# Patient Record
Sex: MALE | Race: OTHER
[De-identification: names, ages, dates, MRNs, and addresses within clinical notes are randomized per-mention and may not be internally consistent; named-entity substitution may affect disease eponyms.]

---

## 2023-01-23 ENCOUNTER — HOSPITAL ENCOUNTER (EMERGENCY)
Dept: HOSPITAL 54 - ER | Age: 58
Discharge: HOME | End: 2023-01-23
Payer: COMMERCIAL

## 2023-01-23 VITALS — HEIGHT: 68 IN | WEIGHT: 210 LBS | BODY MASS INDEX: 31.83 KG/M2

## 2023-01-23 VITALS — DIASTOLIC BLOOD PRESSURE: 70 MMHG | SYSTOLIC BLOOD PRESSURE: 132 MMHG

## 2023-01-23 DIAGNOSIS — S52.511A: Primary | ICD-10-CM
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
## 2023-01-23 DIAGNOSIS — Z60.2: ICD-10-CM
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
## 2023-01-23 DIAGNOSIS — S50.312A: ICD-10-CM
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
## 2023-01-23 SDOH — SOCIAL STABILITY - SOCIAL INSECURITY: PROBLEMS RELATED TO LIVING ALONE: Z60.2

## 2023-01-23 NOTE — NUR
BIBS FOR [facility] DENIES HEAD TRUAMA -KO C/O RIGHT WRIST PAIN AND LEFT ELBOW 
PAIN/ABRASION. PT A/OX4. TOLERATING R/A WELL WITH NO RESP DISTRESS. SAFETY 
MEASURES IN PLACE.